# Patient Record
Sex: MALE | Race: ASIAN | NOT HISPANIC OR LATINO
[De-identification: names, ages, dates, MRNs, and addresses within clinical notes are randomized per-mention and may not be internally consistent; named-entity substitution may affect disease eponyms.]

---

## 2023-01-06 ENCOUNTER — APPOINTMENT (OUTPATIENT)
Dept: PODIATRY | Facility: CLINIC | Age: 68
End: 2023-01-06
Payer: COMMERCIAL

## 2023-01-06 DIAGNOSIS — M19.072 PRIMARY OSTEOARTHRITIS, LEFT ANKLE AND FOOT: ICD-10-CM

## 2023-01-06 DIAGNOSIS — Z78.9 OTHER SPECIFIED HEALTH STATUS: ICD-10-CM

## 2023-01-06 DIAGNOSIS — Z86.718 PERSONAL HISTORY OF OTHER VENOUS THROMBOSIS AND EMBOLISM: ICD-10-CM

## 2023-01-06 DIAGNOSIS — Z82.49 FAMILY HISTORY OF ISCHEMIC HEART DISEASE AND OTHER DISEASES OF THE CIRCULATORY SYSTEM: ICD-10-CM

## 2023-01-06 DIAGNOSIS — Z87.891 PERSONAL HISTORY OF NICOTINE DEPENDENCE: ICD-10-CM

## 2023-01-06 DIAGNOSIS — M79.672 PAIN IN LEFT FOOT: ICD-10-CM

## 2023-01-06 DIAGNOSIS — M20.5X9 OTHER DEFORMITIES OF TOE(S) (ACQUIRED), UNSPECIFIED FOOT: ICD-10-CM

## 2023-01-06 DIAGNOSIS — M20.10 HALLUX VALGUS (ACQUIRED), UNSPECIFIED FOOT: ICD-10-CM

## 2023-01-06 PROCEDURE — 73630 X-RAY EXAM OF FOOT: CPT | Mod: LT

## 2023-01-06 PROCEDURE — 99203 OFFICE O/P NEW LOW 30 MIN: CPT | Mod: 25

## 2023-01-06 RX ORDER — DICLOFENAC SODIUM 1% 10 MG/G
1 GEL TOPICAL DAILY
Qty: 1 | Refills: 3 | Status: ACTIVE | COMMUNITY
Start: 2023-01-06 | End: 1900-01-01

## 2023-01-06 RX ORDER — DICLOFENAC SODIUM 1% 10 MG/G
1 GEL TOPICAL DAILY
Qty: 1 | Refills: 0 | Status: ACTIVE | COMMUNITY
Start: 2023-01-06 | End: 1900-01-01

## 2023-01-10 PROBLEM — M20.10 HALLUX VALGUS: Status: ACTIVE | Noted: 2023-01-09

## 2023-01-10 PROBLEM — M79.672 LEFT FOOT PAIN: Status: ACTIVE | Noted: 2023-01-09

## 2023-01-10 PROBLEM — M20.5X9 HALLUX LIMITUS: Status: ACTIVE | Noted: 2023-01-09

## 2023-01-10 NOTE — HISTORY OF PRESENT ILLNESS
[Sneakers] : nerissa [FreeTextEntry1] : This pleasant patient presents today for evaluation and care of pain at the left bunion. He states that he had surgery over 2 years ago and he can't walk barefoot. It interferes with his lifestyle and he continues to have pain. The level of pain is a 4 or 5/10. The patient presents today asking about all treatment options.\par \par

## 2023-01-10 NOTE — ASSESSMENT
[FreeTextEntry1] : \par Impression: Hallux limitus, left. Recurrent bunion. Pain.\par \par Treatment:  I gave the patient toe separators and recommended silicone toe separators. I wrote for Voltaren gel for the patient to use daily. I discussed the possibility of a one-time injection with the patient but he really doesn't want to go that route. He has had injections in the past and quite frankly I don't think it is going to help in this particular situation. Hopefully the toe separator and topical anti-inflammatory helps settle this down. With continued pain that persists I did discuss the possibility of 1st MPJ fusion procedure. I discussed the procedure and postoperative course and explained to the patient that he could try to live with this and that the procedure that I am recommending if I do it this year, next year or in 5 years will be the same procedure so he could try to live with it and see how it progresses. With continued pain that persists and interferes with his lifestyle he should follow-up in the office. I restated that an injection is a possible option as well. He will follow-up for evaluation as needed.

## 2023-01-10 NOTE — PROCEDURE
[FreeTextEntry1] : X-rays were taken to assess the bunion deformity and rule out arthritis.\par \par X-ray Report: (Left foot - 3 views) X-rays demonstrate recurrent HAV with bunion with IM angle of about 16 degrees. 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

## 2023-01-10 NOTE — PHYSICAL EXAM
[2+] : left foot posterior tibialis 2+ [1+] : left foot dorsalis pedis 1+ [Sensation] : the sensory exam was normal to light touch and pinprick [No Focal Deficits] : no focal deficits [Deep Tendon Reflexes (DTR)] : deep tendon reflexes were 2+ and symmetric [Motor Exam] : the motor exam was normal [Delayed in the Right Toes] : capillary refills normal in right toes [Delayed in the Left Toes] : capillary refills normal in the left toes [de-identified] : The patient has a recurrent bunion. He has pain with end-stage dorsiflexion or plantar flexion. He has pain more at the plantar 1st MPJ and the 1st met. sesamoid articulation. He has a moderate bunion deformity with mild swelling and slight erythema. There is limited ROM. It is limited 50% compared to his contralateral foot consistent with hallux limitus. [FreeTextEntry1] : Well coapted medial incision at the left bunion. Mild swelling about the 1st MPJ.